# Patient Record
Sex: FEMALE | Race: BLACK OR AFRICAN AMERICAN | URBAN - METROPOLITAN AREA
[De-identification: names, ages, dates, MRNs, and addresses within clinical notes are randomized per-mention and may not be internally consistent; named-entity substitution may affect disease eponyms.]

---

## 2022-07-09 ENCOUNTER — TELEPHONE ENCOUNTER (OUTPATIENT)
Dept: URBAN - METROPOLITAN AREA CLINIC 121 | Facility: CLINIC | Age: 77
End: 2022-07-09

## 2022-07-09 RX ORDER — OMEPRAZOLE 40 MG/1
CAPSULE, DELAYED RELEASE ORAL ONCE A DAY
Refills: 3 | OUTPATIENT
Start: 2013-10-09 | End: 2014-09-04

## 2022-07-10 ENCOUNTER — TELEPHONE ENCOUNTER (OUTPATIENT)
Dept: URBAN - METROPOLITAN AREA CLINIC 121 | Facility: CLINIC | Age: 77
End: 2022-07-10

## 2022-07-10 RX ORDER — ATORVASTATIN CALCIUM 20 MG/1
TABLET, FILM COATED ORAL
Refills: 0 | Status: ACTIVE | COMMUNITY
Start: 2013-09-17

## 2022-07-10 RX ORDER — ASPIRIN 81 MG/1
TABLET, COATED ORAL
Refills: 0 | Status: ACTIVE | COMMUNITY
Start: 2013-09-17

## 2022-07-10 RX ORDER — FUROSEMIDE 40 MG/1
TABLET ORAL
Refills: 0 | Status: ACTIVE | COMMUNITY
Start: 2013-09-17

## 2022-07-10 RX ORDER — OMEPRAZOLE 40 MG/1
TAKE ONE CAPSULE BY MOUTH EVERY DAY CAPSULE, DELAYED RELEASE ORAL ONCE A DAY
Refills: 0 | Status: ACTIVE | COMMUNITY
Start: 2014-09-04

## 2022-07-10 RX ORDER — NITROGLYCERIN 0.3 MG/1
TABLET SUBLINGUAL
Refills: 0 | Status: ACTIVE | COMMUNITY
Start: 2013-09-17

## 2022-07-10 RX ORDER — SAXAGLIPTIN AND METFORMIN HYDROCHLORIDE 5; 1000 MG/1; MG/1
TABLET, FILM COATED, EXTENDED RELEASE ORAL
Refills: 0 | Status: ACTIVE | COMMUNITY
Start: 2013-09-17

## 2022-07-10 RX ORDER — AMLODIPINE BESYLATE 10 MG/1
TABLET ORAL
Refills: 0 | Status: ACTIVE | COMMUNITY
Start: 2013-09-17

## 2022-07-10 RX ORDER — PRENATAL VIT 49/IRON FUM/FOLIC 6.75-0.2MG
TABLET ORAL
Refills: 0 | Status: ACTIVE | COMMUNITY
Start: 2013-09-17

## 2022-07-10 RX ORDER — POTASSIUM CHLORIDE 750 MG/1
TABLET, FILM COATED, EXTENDED RELEASE ORAL
Refills: 0 | Status: ACTIVE | COMMUNITY
Start: 2013-09-17

## 2022-07-10 RX ORDER — METFORMIN HCL 500 MG/1
TABLET ORAL
Refills: 0 | Status: ACTIVE | COMMUNITY
Start: 2013-09-17

## 2024-10-22 ENCOUNTER — DASHBOARD ENCOUNTERS (OUTPATIENT)
Age: 79
End: 2024-10-22

## 2024-10-22 ENCOUNTER — OFFICE VISIT (OUTPATIENT)
Dept: URBAN - METROPOLITAN AREA CLINIC 63 | Facility: CLINIC | Age: 79
End: 2024-10-22
Payer: COMMERCIAL

## 2024-10-22 VITALS — BODY MASS INDEX: 47.13 KG/M2 | WEIGHT: 266 LBS | HEIGHT: 63 IN | TEMPERATURE: 98.2 F

## 2024-10-22 DIAGNOSIS — R97.0 ELEVATED CEA: ICD-10-CM

## 2024-10-22 PROBLEM — 445201008: Status: ACTIVE | Noted: 2024-10-22

## 2024-10-22 PROCEDURE — 99213 OFFICE O/P EST LOW 20 MIN: CPT

## 2024-10-22 RX ORDER — TRAZODONE HYDROCHLORIDE 50 MG/1
TABLET ORAL
Qty: 90 EACH | Refills: 2 | Status: ACTIVE | COMMUNITY

## 2024-10-22 RX ORDER — ROSUVASTATIN CALCIUM 40 MG/1
TABLET, FILM COATED ORAL
Qty: 90 TABLET | Status: ACTIVE | COMMUNITY

## 2024-10-22 RX ORDER — FUROSEMIDE 40 MG/1
1 TABLET TABLET ORAL ONCE A DAY
Status: ACTIVE | COMMUNITY

## 2024-10-22 RX ORDER — PANTOPRAZOLE SODIUM 40 MG/1
1 TABLET TABLET, DELAYED RELEASE ORAL ONCE A DAY
Status: ACTIVE | COMMUNITY

## 2024-10-22 RX ORDER — HYDRALAZINE HYDROCHLORIDE 50 MG/1
TABLET ORAL
Qty: 270 TABLET | Status: ACTIVE | COMMUNITY

## 2024-10-22 RX ORDER — BUDESONIDE AND FORMOTEROL FUMARATE DIHYDRATE 160; 4.5 UG/1; UG/1
AEROSOL RESPIRATORY (INHALATION)
Qty: 10.2 GRAM | Status: ACTIVE | COMMUNITY

## 2024-10-22 RX ORDER — PENTOXIFYLLINE 400 MG/1
TABLET, EXTENDED RELEASE ORAL
Qty: 180 TABLET | Status: ACTIVE | COMMUNITY

## 2024-10-22 RX ORDER — VIBEGRON 75 MG/1
TABLET, FILM COATED ORAL
Qty: 90 TABLET | Status: ACTIVE | COMMUNITY

## 2024-10-22 RX ORDER — ALLOPURINOL 100 MG/1
TABLET ORAL
Qty: 90 TABLET | Status: ACTIVE | COMMUNITY

## 2024-10-22 RX ORDER — EMPAGLIFLOZIN 10 MG/1
TABLET, FILM COATED ORAL
Qty: 90 TABLET | Status: ACTIVE | COMMUNITY

## 2024-10-22 RX ORDER — CARVEDILOL 3.12 MG/1
TABLET, FILM COATED ORAL
Qty: 180 TABLET | Status: ACTIVE | COMMUNITY

## 2024-10-22 RX ORDER — LATANOPROST 50 UG/ML
SOLUTION OPHTHALMIC
Qty: 7.5 MILLILITER | Refills: 2 | Status: ACTIVE | COMMUNITY

## 2024-10-22 RX ORDER — BLOOD SUGAR DIAGNOSTIC
STRIP MISCELLANEOUS
Qty: 300 STRIP | Status: ACTIVE | COMMUNITY

## 2024-10-22 RX ORDER — LEVOTHYROXINE SODIUM 25 UG/1
TAKE 1 TABLET BY MOUTH EVERY DAY TABLET ORAL
Qty: 90 EACH | Refills: 3 | Status: ACTIVE | COMMUNITY

## 2024-10-22 NOTE — PHYSICAL EXAM CHEST:
no lesions, no deformities, no traumatic injuries, no significant scars are present, chest wall non-tender, no masses present, breathing is unlabored without accessory muscle use,normal breath sounds 162.56

## 2024-10-22 NOTE — HPI-PREVIOUS PROCEDURES
Colonoscopy, 10/9/2013, Dr. Galo - A single 14 mm polyp was found in the ascending colon.  Resected and retrieved. - A single 13 mm polyp was found in the sigmoid colon, resected and retrieved. - The was evidence of diverticulosis in the entire examined colon. - Hemorrhoids were found. . EGD, and esophageal stricture was found to be causing moderate obstruction.  A biopsy was taken.  Dilatation was performed using a Arrington bougie.  A 54 [place] dilator was passed. - Mild gastritis was found in the antrum.  A biopsy was taken.  There was an 8 cm hiatal hernia visible in the stomach. - Duodenum appeared to be normal. - Recommend omeprazole 40 mg. . Colonoscopy and EGD pathology report, 10/9/2013 - Antral biopsy; fragments of gastric antral type mucosa with mild focal nonspecific chronic inflammation.  Special stain for Helicobacter pylori was negative. - Distal esophagus biopsy fragments of esophageal squamous mucosa with moderate chronic inflammation including scattered eosinophils consistent with reflux esophagitis.  Negative for dysplasia or malignancy.  No intestinal metaplasia or fungal organisms were identified on stain.  No viral inclusions seen.  Fragments of gastric cardia type mucosa no specific pathological change. - Large bowel ascending colon polyp; tubular adenoma. - Large bowel sigmoid colon polyp; hyperplastic polyp

## 2024-10-22 NOTE — HPI-PREVIOUS LABS
Labs  dated 7/18/2024 CEA - 4.4 (high) . Alpha-fetoprotein  - within normal limits .   - within normal limits

## 2024-10-22 NOTE — HPI-TODAY'S VISIT:
This is a very pleasant 78-year-old female who presents to the office with a chief complaint of "high CEA".  Past medical history is significant for hyperlipidemia, hypertension, thyroid disease, type 2 diabetes, glaucoma, asthma/COPD, gout, CHF, SELIN. Past surgical history includes cardiac catheterization.  Family history is significant for liver cancer in maternal grandfather. Last colonoscopy 10/19/2013, Dr. Galo, 3-year recall Last endoscopy 10/9/2013, Dr. Galo Patient has a cardiologist and a pulmonologist. . Patient was last seen in the office on 10/23/2013 with JR Carter for a follow-up of colonoscopy and endoscopy with dilation.  Patient had esophageal stricture which was dilated.  Patient had tolerated the procedure well without complications.  Patient was given a 3-year recall. . Patient presents today with her sister for a referral for a high CEA.  I explained to the patient and the patient's sister that CEA a nonspecific tumor marker, but can indicate CRC.  Of note, patient's last colonoscopy was in 2013 with Dr. Galo, two polyps were seen, both greater than 10 mm, one being a tubular adenoma.  Patient was to have a 3-year recall, but never returned.  Patient today ambulates with a walking device and is a 1 person transfer, patient will likely need nursing assistance for transferring.  In addition, patient's sister explains that patient does use home oxygen via nasal cannula and through her CPAP.  I explained to the patient and patient's sister that it would be safer to have the patient have a colonoscopy in a hospital setting.  I referred patient to [facility] for all future procedures. . Patient denies abdominal pain, belching, bloating, constipation, diarrhea, dysphagia, pyrosis, melena, hematochezia, hematemesis, nausea, vomiting, BRBPR, and unintentional weight loss.

## 2025-02-27 ENCOUNTER — P2P PATIENT RECORD (OUTPATIENT)
Age: 80
End: 2025-02-27